# Patient Record
Sex: MALE | Race: WHITE
[De-identification: names, ages, dates, MRNs, and addresses within clinical notes are randomized per-mention and may not be internally consistent; named-entity substitution may affect disease eponyms.]

---

## 2019-09-12 ENCOUNTER — HOSPITAL ENCOUNTER (OUTPATIENT)
Dept: HOSPITAL 62 - SP | Age: 44
End: 2019-09-12
Attending: SPECIALIST
Payer: COMMERCIAL

## 2019-09-12 DIAGNOSIS — R00.0: ICD-10-CM

## 2019-09-12 DIAGNOSIS — I37.1: Primary | ICD-10-CM

## 2019-09-12 PROCEDURE — 93306 TTE W/DOPPLER COMPLETE: CPT

## 2019-09-15 NOTE — XCELERA REPORT
[address] [address]

                               Tel: [phone]

                               Fax: [phone]



                      Transthoracic Echocardiogram Report

_______________________________________________________________________________



Name: NIMCO BARRERA

MRN: [id number]                                Age: 44 yrs

Gender: Male                                   : 1975

Patient Status: Preadmit                       Patient Location: [facility]

Account #: [id number]

Study Date: 2019 11:05 AM

Accession #: [id number]

_______________________________________________________________________________



Height: 66 in        Weight: 290 lb        BSA: 2.3 m2

_______________________________________________________________________________

Procedure: A two-dimensional transthoracic echocardiogram with color flow and

Doppler was performed. The study was technically limited with all images being

suboptimal in quality. The study was technically difficult with many images

being suboptimal in quality.

Reason For Study: TACHYCARDIA



History: TACHYCARDIA.

Ordering Physician: ALMA ALEGRIA



Performed By: Meredith Ashby

_______________________________________________________________________________



Interpretation Summary

A two-dimensional transthoracic echocardiogram with color flow and Doppler was

performed.

The left ventricle is normal in size.

There is normal left ventricular wall thickness.

The left ventricular ejection fraction is within normal limits.

LV EF is 65%

Doppler measurements suggest normal left ventricular diastolic function

The left ventricular wall motion is normal.

There is no thrombus.

No ASD , VSD , or PFO seen.

The left atrial size is normal.

There is no evidence of mitral valve prolapse.

There is no vegetation seen on the mitral valve.

There is no mitral valve stenosis.

There is no mitral regurgitation noted.

There is no aortic valvular vegetation.

There is no aortic valve stenosis

There is no LVOT obstruction.

No aortic regurgitation is present.

There is no tricuspid stenosis.

No tricuspid regurgitation.

Unablr to calculate RVSP due to insufficient TR jet.

There is no pulmonic valvular stenosis.

There is a trace amount of pulmonic regurgitation

The aortic root is normal size.

The inferior vena cava was not well visualized

There is no pericardial effusion.



MMode/2D Measurements & Calculations

RVDd: 2.7 cm   LVIDd: 4.9 cm  FS: 40.1 %              Ao root diam: 2.7 cm



IVSd: 0.73 cm  LVIDs: 2.9 cm  EDV(Teich): 112.6 ml    Ao root area: 5.6 cm2

               LVPWd: 1.0 cm  ESV(Teich): 33.1 ml

                              EF(Teich): 70.6 %



Doppler Measurements & Calculations

MV E max carmen:       MV dec slope:         Ao V2 max:        LV V1 max P.4 cm/sec         400.2 cm/sec2         122.7 cm/sec      3.9 mmHg

MV A max carmen:       MV dec time: 0.20 sec Ao max PG:        LV V1 max:

69.6 cm/sec                               6.0 mmHg          99.0 cm/sec

MV E/A: 1.2

        _______________________________________________________________

PA V2 max:          PI end-d carmen:

127.2 cm/sec        88.6 cm/sec

PA max P.5 mmHg





Left Ventricle

The left ventricle is normal in size. There is normal left ventricular wall

thickness. The left ventricular ejection fraction is within normal limits. LV

EF is 65%. Doppler measurements suggest normal left ventricular diastolic

function. The left ventricular wall motion is normal. There is no thrombus. No

ASD , VSD , or PFO seen.



Right Ventricle

The right ventricle is not well visualized secondary to technical limitations.



Atria

Right atrium not well visualized secondary to technical limitations. The left

atrial size is normal.



Mitral Valve

There is no evidence of mitral valve prolapse. There is no vegetation seen on

the mitral valve. There is no mitral valve stenosis. There is no mitral

regurgitation noted.





Aortic Valve

There is no aortic valvular vegetation. There is no aortic valve stenosis.

There is no LVOT obstruction. No aortic regurgitation is present.



Tricuspid Valve

There is no tricuspid stenosis. No tricuspid regurgitation. Unablr to

calculate RVSP due to insufficient TR jet.



Pulmonic Valve

There is no pulmonic valvular stenosis. There is a trace amount of pulmonic

regurgitation.



Great Vessels

The aortic root is normal size. The inferior vena cava was not well

visualized.





Effusions

There is no pericardial effusion.



_______________________________________________________________________________

_______________________________________________________________________________



Electronically signed by:      Alma Alegria      on 09/15/2019 06:48 PM



CC: ALMA ALEGRIA Lakshmi

## 2020-07-02 ENCOUNTER — HOSPITAL ENCOUNTER (EMERGENCY)
Dept: HOSPITAL 62 - ER | Age: 45
Discharge: LEFT BEFORE DISCHARGE | End: 2020-07-02
Payer: COMMERCIAL

## 2020-07-02 DIAGNOSIS — Z53.21: Primary | ICD-10-CM

## 2020-07-04 ENCOUNTER — HOSPITAL ENCOUNTER (EMERGENCY)
Dept: HOSPITAL 62 - ER | Age: 45
Discharge: ANOTHER HOSPITAL | End: 2020-07-04
Payer: COMMERCIAL

## 2020-07-04 VITALS — DIASTOLIC BLOOD PRESSURE: 90 MMHG | SYSTOLIC BLOOD PRESSURE: 150 MMHG

## 2020-07-04 DIAGNOSIS — N13.8: Primary | ICD-10-CM

## 2020-07-04 DIAGNOSIS — R10.9: ICD-10-CM

## 2020-07-04 DIAGNOSIS — Z87.442: ICD-10-CM

## 2020-07-04 DIAGNOSIS — Z20.828: ICD-10-CM

## 2020-07-04 DIAGNOSIS — N20.0: ICD-10-CM

## 2020-07-04 DIAGNOSIS — R11.2: ICD-10-CM

## 2020-07-04 LAB
ADD MANUAL DIFF: NO
ALBUMIN SERPL-MCNC: 4.7 G/DL (ref 3.5–5)
ALP SERPL-CCNC: 87 U/L (ref 38–126)
ANION GAP SERPL CALC-SCNC: 8 MMOL/L (ref 5–19)
APPEARANCE UR: CLEAR
APTT PPP: YELLOW S
AST SERPL-CCNC: 23 U/L (ref 17–59)
BASOPHILS # BLD AUTO: 0.1 10^3/UL (ref 0–0.2)
BASOPHILS NFR BLD AUTO: 0.5 % (ref 0–2)
BILIRUB DIRECT SERPL-MCNC: 0 MG/DL (ref 0–0.4)
BILIRUB SERPL-MCNC: 0.4 MG/DL (ref 0.2–1.3)
BILIRUB UR QL STRIP: NEGATIVE
BUN SERPL-MCNC: 15 MG/DL (ref 7–20)
CALCIUM: 10 MG/DL (ref 8.4–10.2)
CHLORIDE SERPL-SCNC: 102 MMOL/L (ref 98–107)
CO2 SERPL-SCNC: 30 MMOL/L (ref 22–30)
EOSINOPHIL # BLD AUTO: 0.4 10^3/UL (ref 0–0.6)
EOSINOPHIL NFR BLD AUTO: 2.8 % (ref 0–6)
ERYTHROCYTE [DISTWIDTH] IN BLOOD BY AUTOMATED COUNT: 14.7 % (ref 11.5–14)
GLUCOSE SERPL-MCNC: 144 MG/DL (ref 75–110)
GLUCOSE UR STRIP-MCNC: NEGATIVE MG/DL
HCT VFR BLD CALC: 45.6 % (ref 37.9–51)
HGB BLD-MCNC: 15.3 G/DL (ref 13.5–17)
KETONES UR STRIP-MCNC: NEGATIVE MG/DL
LYMPHOCYTES # BLD AUTO: 2.2 10^3/UL (ref 0.5–4.7)
LYMPHOCYTES NFR BLD AUTO: 16.6 % (ref 13–45)
MCH RBC QN AUTO: 31.5 PG (ref 27–33.4)
MCHC RBC AUTO-ENTMCNC: 33.6 G/DL (ref 32–36)
MCV RBC AUTO: 94 FL (ref 80–97)
MONOCYTES # BLD AUTO: 1 10^3/UL (ref 0.1–1.4)
MONOCYTES NFR BLD AUTO: 7.7 % (ref 3–13)
NEUTROPHILS # BLD AUTO: 9.8 10^3/UL (ref 1.7–8.2)
NEUTS SEG NFR BLD AUTO: 72.4 % (ref 42–78)
NITRITE UR QL STRIP: NEGATIVE
PH UR STRIP: 5 [PH] (ref 5–9)
PLATELET # BLD: 261 10^3/UL (ref 150–450)
POTASSIUM SERPL-SCNC: 4.5 MMOL/L (ref 3.6–5)
PROT SERPL-MCNC: 7.8 G/DL (ref 6.3–8.2)
PROT UR STRIP-MCNC: NEGATIVE MG/DL
RBC # BLD AUTO: 4.86 10^6/UL (ref 4.35–5.55)
SP GR UR STRIP: 1.01
TOTAL CELLS COUNTED % (AUTO): 100 %
UROBILINOGEN UR-MCNC: NEGATIVE MG/DL (ref ?–2)
WBC # BLD AUTO: 13.5 10^3/UL (ref 4–10.5)

## 2020-07-04 PROCEDURE — 85025 COMPLETE CBC W/AUTO DIFF WBC: CPT

## 2020-07-04 PROCEDURE — 96361 HYDRATE IV INFUSION ADD-ON: CPT

## 2020-07-04 PROCEDURE — 96376 TX/PRO/DX INJ SAME DRUG ADON: CPT

## 2020-07-04 PROCEDURE — 96374 THER/PROPH/DIAG INJ IV PUSH: CPT

## 2020-07-04 PROCEDURE — 83690 ASSAY OF LIPASE: CPT

## 2020-07-04 PROCEDURE — 81001 URINALYSIS AUTO W/SCOPE: CPT

## 2020-07-04 PROCEDURE — 87635 SARS-COV-2 COVID-19 AMP PRB: CPT

## 2020-07-04 PROCEDURE — 80053 COMPREHEN METABOLIC PANEL: CPT

## 2020-07-04 PROCEDURE — C9803 HOPD COVID-19 SPEC COLLECT: HCPCS

## 2020-07-04 PROCEDURE — 99285 EMERGENCY DEPT VISIT HI MDM: CPT

## 2020-07-04 PROCEDURE — 74176 CT ABD & PELVIS W/O CONTRAST: CPT

## 2020-07-04 PROCEDURE — 36415 COLL VENOUS BLD VENIPUNCTURE: CPT

## 2020-07-04 PROCEDURE — 96375 TX/PRO/DX INJ NEW DRUG ADDON: CPT

## 2020-07-04 NOTE — ER DOCUMENT REPORT
ED GI/





- General


Chief Complaint: Possible Kidney Stone


Stated Complaint: RIGHT FLANK PAIN


Time Seen by Provider: 07/04/20 16:28


Primary Care Provider: 


ANDREINA HARO NP [Primary Care Provider] - Follow up as needed


Mode of Arrival: Ambulatory


Notes: 





CHIEF COMPLAINT: Flank pain nausea vomiting





HPI: 44-year-old male presenting to the emergency department complaining of 

flank pain more on the right than the left with some nausea vomiting that 

occurred last night slight nausea today.  Does have kidney stone history.  Does 

not follow with a urologist generally.  No fever





ROS: See HPI - all other systems were reviewed and are otherwise negative


Constitutional: no fever 


Eyes: no drainage, no blurred vision


ENT: no runny nose, no sore throat


Cardiovascular:  no chest pain 


Resp: no SOB, no cough


GI: + vomiting, no diarrhea, + abdominal pain


: no dysuria


Integumentary: no rash 


Allergy: no hives 


Musculoskeletal: no extremity pain or swelling 


Neurological: no numbness/tingling, no weakness





MEDICATIONS: I agree with the patient medications as charted by the RN.





ALLERGIES: I agree with the allergies as charted by the RN.





PAST MEDICAL HISTORY/PAST SURGICAL HISTORY: Reviewed and agree as charted by RN.





SOCIAL HISTORY: Reviewed and agree as charted by RN.





FAMILY HISTORY: No significant familial comorbid conditions directly related to 

patient complaint





EXAM:


Reviewed vital signs as charted by RN.


CONSTITUTIONAL: Alert and oriented and responds appropriately to questions. 

Well-appearing; well-nourished


HEAD: Normocephalic; atraumatic


EYES: PERRL; Conjunctivae clear, sclerae non-icteric


ENT: normal nose; no rhinorrhea; moist mucous membranes; pharynx without lesions

noted, no uvula edema or deviation, no tonsillar hypertrophy, phonation normal


NECK: Supple without meningismus; non-tender; no cervical lymphadenopathy, no 

masses


CARD: RRR; no murmurs, no clicks, no rubs, no gallops; symmetric distal pulses


RESP: Normal chest excursion without splinting or tachypnea; breath sounds clear

and equal bilaterally; no wheezes, no rhonchi, no rales, pulse oximetry 


ABD/GI: Obese, normal bowel sounds; non-distended; soft, non-tender, no rebound,

no guarding; no palpable organomegaly or masses.


BACK:  The back appears normal and is non-tender to palpation, there is mild 

bilateral CVA tenderness


EXT: Normal ROM in all joints; non-tender to palpation; no cyanosis, no 

effusions, no edema   


SKIN: Normal color for age and race; warm; dry; good turgor; no acute lesions 

noted


NEURO: Moves all extremities equally; Motor and sensory function intact 


PSYCH: The patient's mood and manner are appropriate. Grooming and personal 

hygiene are appropriate.





MDM: 44-year-old male with kidney stone history with bilateral flank pain but 

worse on the right.  Patient has bilateral obstructing proximal stone 7 mm on 

the right 10 mm on the left.  Slight elevation of his creatinine at 1.43.  

Awaiting urinalysis.  Will give additional pain medication have [facility] awaiting callback for transfer as we do not have urology 

on call


TRAVEL OUTSIDE OF THE [place] IN LAST 30 DAYS: No





- Related Data


Allergies/Adverse Reactions: 


                                        





No Known Allergies Allergy (Verified 07/04/20 16:22)


   











Past Medical History





- General


Information source: Patient





- Social History


Smoking Status: Never Smoker


Family History: Reviewed & Not Pertinent


Patient has homicidal ideation: No


Renal/ Medical History: Reports: Hx Kidney Stones - 2 years ago





- Immunizations


Hx Diphtheria, Pertussis, Tetanus Vaccination: Yes





Physical Exam





- Vital signs


Vitals: 


                                        











Temp Pulse Resp BP Pulse Ox


 


 97.8 F   60   16   161/97 H  99 


 


 07/04/20 16:08  07/04/20 16:08  07/04/20 16:08  07/04/20 16:08  07/04/20 16:08














Course





- Re-evaluation


Re-evalutation: 





07/04/20 18:28


[facility] with [facility], Awaiting callback from Dr. Bartolo Null, Urology


07/04/20 19:01


Spoke with Dr[facility] Bart[facility].  He has reviewed the 

images request the patient be [facility] to [facility].  Patient is stable.  

Discussed with Dr. Mercedes attending.  Dr. Null has requested that the patient 

come POV patient is agreeable to this as it will speed up the time that he can 

be treated.  Dr. Null requests that the patient come to the emergency 

department registration and he will have orders there for the patient to be 

direct admitted to the OR for stent placement.  States patient will likely be 

able to go home.  This was discussed at length with the patient who is agreeing 

to this plan.  Patient is aware that he must stay n.p.o. prior to surgery.





- Vital Signs


Vital signs: 


                                        











Temp Pulse Resp BP Pulse Ox


 


 97.8 F   60   16   161/97 H  99 


 


 07/04/20 16:08  07/04/20 16:08  07/04/20 16:08  07/04/20 16:08  07/04/20 16:08














- Laboratory


Result Diagrams: 


                                 07/04/20 16:30





                                 07/04/20 16:30


Laboratory results interpreted by me: 


                                        











  07/04/20 07/04/20 07/04/20





  16:30 16:30 17:50


 


WBC  13.5 H  


 


RDW  14.7 H  


 


Absolute Neuts (auto)  9.8 H  


 


Creatinine   1.43 H 


 


Est GFR (MDRD) Non-Af   54 L 


 


Glucose   144 H 


 


Urine Blood    LARGE H


 


Ur Leukocyte Esterase    TRACE H














Discharge





- Discharge


Clinical Impression: 


 Bilateral kidney stones, Urinary tract obstruction due to kidney stone





Condition: Stable


Disposition: [facility]


Additional Instructions: 


Proceed directly to the emergency department registration at [facility] in [place].  Dr. Bartolo Null is the urologist that we spoke with and

he stated that he will have orders for a direct admission to the operating room 

for you for stent placement.  If there are any questions when you arrive there 

they may page Dr. Null directly.  Do not eat or drink anything prior to being 

evaluated by urology


Referrals: 


ANDREINA HARO, NP [Primary Care Provider] - Follow up as needed

## 2020-07-04 NOTE — ER DOCUMENT REPORT
ED Medical Screen (RME)





- General


Chief Complaint: Possible Kidney Stone


Stated Complaint: RIGHT FLANK PAIN


Time Seen by Provider: 07/04/20 16:28


Primary Care Provider: 


ANDREINA HARO NP [Primary Care Provider] - Follow up as needed


Mode of Arrival: Ambulatory


Information source: Patient


Notes: 





44-year-old male presented to ED for complaint of right flank pain that started 

on Friday.  He states he is not taking anything for this pain.  He does have 

history of kidney stones.  He states he has been nauseated since morning.  He 

states the pain is gotten worse all day.  He is alert oriented respirations 

regular nonlabored speaking in full sentences.  He is pale and diaphoretic.  I 

have ordered him IV fluids, Toradol Zofran Percocet and Flomax.  I have also 

ordered a CT abdomen pelvis with no IV or oral contrast. 

















I have greeted and performed a rapid initial assessment of this patient.  A 

comprehensive ED assessment and evaluation of the patient, analysis of test 

results and completion of medical decision making process will be conducted by 

an additional ED providers.


TRAVEL OUTSIDE OF THE [place] IN LAST 30 DAYS: No





- Related Data


Allergies/Adverse Reactions: 


                                        





No Known Allergies Allergy (Verified 07/04/20 16:22)


   











Past Medical History


Renal/ Medical History: Reports: Hx Kidney Stones - 2 years ago





- Immunizations


Hx Diphtheria, Pertussis, Tetanus Vaccination: Yes





Physical Exam





- Vital signs


Vitals: 





                                        











Temp Pulse Resp BP Pulse Ox


 


 97.8 F   60   16   161/97 H  99 


 


 07/04/20 16:08  07/04/20 16:08  07/04/20 16:08  07/04/20 16:08  07/04/20 16:08














Course





- Vital Signs


Vital signs: 





                                        











Temp Pulse Resp BP Pulse Ox


 


 97.8 F   60   16   161/97 H  99 


 


 07/04/20 16:08  07/04/20 16:08  07/04/20 16:08  07/04/20 16:08  07/04/20 16:08














Doctor's Discharge





- Discharge


Referrals: 


ANDREINA HARO NP [Primary Care Provider] - Follow up as needed

## 2020-07-04 NOTE — RADIOLOGY REPORT (SQ)
EXAM DESCRIPTION:  CT ABD/PELVIS NO ORAL OR IV



IMAGES COMPLETED DATE/TIME:  7/4/2020 5:11 pm



REASON FOR STUDY:  right flank pain



COMPARISON:  9/2/2012



TECHNIQUE:  CT scan of the abdomen and pelvis performed without intravenous or oral contrast. Images 
reviewed with lung, soft tissue, and bone windows. Reconstructed coronal and sagittal MPR images revi
ewed. All images stored on PACS.

All CT scanners at this facility use dose modulation, iterative reconstruction, and/or weight based d
osing when appropriate to reduce radiation dose to as low as reasonably achievable (ALARA).

[facility]: Dose Right  [facility]: CareDose    [facility]: Dose Right    [facility]: Teradose 4D    [facility]: Smart Yohobuy



RADIATION DOSE:  CT Rad equipment meets quality standard of care and radiation dose reduction techniq
ues were employed. CTDIvol: 19.2 mGy. DLP: 1180 mGy-cm.mGy.



LIMITATIONS:  None.



FINDINGS:  LOWER CHEST: There is a 5 mm pulmonary nodule in the right lower lobe (axial image number 
8).  This nodule appears to be present on prior CT but may have increased in size.

NON-CONTRASTED LIVER, SPLEEN, ADRENALS: Evaluation limited by lack of IV contrast. No identified sign
ificant masses.

PANCREAS: No masses. No peripancreatic inflammatory changes.

GALLBLADDER: No identified stones by CT criteria. No inflammatory changes to suggest cholecystitis.

RIGHT KIDNEY AND URETER: There is a 5 mm stone in the inferior pole of the right kidney.  There is a 
7 mm stone in the proximal right ureter resulting in some mild dilatation of the right renal pelvis a
nd right renal collecting system.

LEFT KIDNEY AND URETER: There is a 1 cm stone in the proximal left ureter resulting in moderate left-
sided hydronephrosis.

AORTA AND RETROPERITONEUM: No aneurysm. No retroperitoneal masses or adenopathy.

BOWEL AND PERITONEAL CAVITY: No obvious masses or inflammatory changes. No free fluid.  A small hiata
l hernia is present.

APPENDIX: Normal.

PELVIS, BLADDER, AND ABDOMINAL WALL:No abnormal masses. No free fluid. Bladder normal.

BONES: No significant findings.

OTHER: No other significant finding.



IMPRESSION:  1.  7 mm obstructing stone in the proximal right ureter

2.  10 mm obstructing stone in the proximal left ureter

3.  Right-sided nephrolithiasis

4.  5 mm pulmonary nodule in the right lower lobe that appears to have grown in size when compared to
 prior imaging of 2012.  Recommended dedicated noncontrast chest CT to further evaluate.

5.  Hiatal hernia



COMMENT:  Quality ID # [id number]: Final reports with documentation of one or more dose reduction techniques
 (e.g., Automated exposure control, adjustment of the mA and/or kV according to patient size, use of 
iterative reconstruction technique)



TECHNICAL DOCUMENTATION:  JOB ID:  [id number]

 2011 [employer]- All Rights Reserved



Reading location - IP/workstation name: [email or web address]